# Patient Record
(demographics unavailable — no encounter records)

---

## 2025-06-06 NOTE — PROCEDURE
[Topical Lidocaine] : topical lidocaine [Oxymetazoline HCl] : oxymetazoline HCl [Flexible Endoscope] : examined with the flexible endoscope [Serial Number: ___] : Serial Number: [unfilled] [Image(s) Captured] : image(s) captured and filed [Video Captured] : video captured and filed [Normal] : posterior cricoid area had healthy pink mucosa in the interarytenoid area and the esophageal inlet [de-identified] : Patient was placed in the examination chair in a sitting position. The nose was decongested with oxymetazoline nasal solution. The airway was anesthetized with 4% Xylocaine.  The back of the throat was anesthetized with Cetacaine. Direct flexible/rigid video endoscopy was performed. Findings revealed: Tongue is clear. Larynx, epiglottis, and vocal cords are normal.

## 2025-06-06 NOTE — DATA REVIEWED
[de-identified] : 6/6/25:  Hearing is WNL from 250-4k Hz with a mild to moderately severe HL thereafter Au.

## 2025-06-06 NOTE — ADDENDUM
[FreeTextEntry1] : Scribe Attestation: I, Suraj Torres, am scribing for and in the presence of Dr. Marc Grossman in the following sections HISTORY OF PRESENT ILLNESS, PAST MEDICAL/FAMILY/SOCIAL HISTORY; REVIEW OF SYSTEMS; VITAL SIGNS; PHYSICAL EXAM; PROCEDURES; DISCUSSION.   I, Dr. Marc Grossman, personally performed the services described in the documentation, reviewed the documentation recorded by the scribe in my presence, and it accurately and completely records my words and actions.

## 2025-06-06 NOTE — CONSULT LETTER
[Dear  ___] : Dear  [unfilled], [Courtesy Letter:] : I had the pleasure of seeing your patient, [unfilled], in my office today. [Please see my note below.] : Please see my note below. [Consult Closing:] : Thank you very much for allowing me to participate in the care of this patient.  If you have any questions, please do not hesitate to contact me. [Sincerely,] : Sincerely, [FreeTextEntry2] : Dr. Mae Espinoza, [FreeTextEntry3] : Marc Grossman MD, PATRICE, FACS  Department Otolaryngology Director of [facility] Professor of Otolaryngology,  [facility] [employer] [facility]

## 2025-06-06 NOTE — HISTORY OF PRESENT ILLNESS
[de-identified] : 75 year old female follow up tongue disorder and clogged sensation in ears. States test for Sjorgen's syndrome, negative. States tongue is tingling, sometimes white, has a dry mouth. States has been to oral pathology, [facility] Sheridan, and was told her tongue is fine. Was taking OTC Quercetin with little relief and stopped. Does salt water gargles Mutiple times a week. Uses a humidifier to sleep. States intermittent clogged sensation and it is sometimes itchy in both ears. Left ear is worse than right. Started about 3 months ago.  Unsure if she has wax build up. No concern for hearing loss. Hears static in silence but does not have ringing in the ears. Patient denies otorrhea, ear infections, hearing loss, tinnitus, dizziness, vertigo, headaches related to hearing.

## 2025-06-06 NOTE — PHYSICAL EXAM
[Midline] : trachea located in midline position [Normal] : no rashes [FreeTextEntry1] : clogged sensation in ears